# Patient Record
Sex: FEMALE | Race: WHITE | RURAL
[De-identification: names, ages, dates, MRNs, and addresses within clinical notes are randomized per-mention and may not be internally consistent; named-entity substitution may affect disease eponyms.]

---
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
## 2021-07-02 ENCOUNTER — OFFICE VISIT (OUTPATIENT)
Dept: FAMILY MEDICINE CLINIC | Age: 21
End: 2021-07-02
Payer: MEDICAID

## 2021-07-02 VITALS
DIASTOLIC BLOOD PRESSURE: 61 MMHG | TEMPERATURE: 98 F | SYSTOLIC BLOOD PRESSURE: 117 MMHG | HEIGHT: 61 IN | WEIGHT: 132 LBS | HEART RATE: 85 BPM | OXYGEN SATURATION: 98 % | RESPIRATION RATE: 16 BRPM | BODY MASS INDEX: 24.92 KG/M2

## 2021-07-02 DIAGNOSIS — Z71.85 ENCOUNTER FOR COUNSELING REGARDING IMMUNIZATION: Primary | ICD-10-CM

## 2021-07-02 DIAGNOSIS — Z23 ENCOUNTER FOR IMMUNIZATION: ICD-10-CM

## 2021-07-02 PROCEDURE — 90734 MENACWYD/MENACWYCRM VACC IM: CPT | Performed by: FAMILY MEDICINE

## 2021-07-02 RX ORDER — ETONOGESTREL 68 MG/1
IMPLANT SUBCUTANEOUS
COMMUNITY

## 2021-07-04 NOTE — PROGRESS NOTES
HISTORY OF PRESENT ILLNESS  Idalmis Hall is a 22 y.o. female. HPI  Pt needs U immunization form completed. She needs Menveo vaccine today. Plan to get COVID vaccine at pharmacy. Records from [facility] reviewed. ROS  Visit Vitals  /61 (BP 1 Location: Left upper arm, BP Patient Position: Sitting, BP Cuff Size: Adult)   Pulse 85   Temp 98 °F (36.7 °C) (Temporal)   Resp 16   Ht 5' 1\" (1.549 m)   Wt 132 lb (59.9 kg)   LMP 06/29/2021 (Approximate)   SpO2 98%   BMI 24.94 kg/m²       Physical Exam  Vitals and nursing note reviewed. Constitutional:       Appearance: Normal appearance. Cardiovascular:      Rate and Rhythm: Normal rate and regular rhythm. Heart sounds: Normal heart sounds. Pulmonary:      Effort: Pulmonary effort is normal.   Neurological:      Mental Status: She is alert. ASSESSMENT and PLAN    ICD-10-CM ICD-9-CM    1.  Encounter for immunization  Z23 V03.89 MENINGOCOCCAL (MENVEO) CONJUGATE VACCINE, SEROGROUPS A, C, Y AND W-135 (TETRAVALENT), IM      CT IMMUNIZ ADMIN,1 SINGLE/COMB VAC/TOXOID

## 2023-05-18 RX ORDER — ETONOGESTREL 68 MG/1
IMPLANT SUBCUTANEOUS
COMMUNITY